# Patient Record
Sex: MALE | Race: WHITE | HISPANIC OR LATINO | URBAN - METROPOLITAN AREA
[De-identification: names, ages, dates, MRNs, and addresses within clinical notes are randomized per-mention and may not be internally consistent; named-entity substitution may affect disease eponyms.]

---

## 2017-12-09 ENCOUNTER — EMERGENCY (EMERGENCY)
Facility: HOSPITAL | Age: 72
LOS: 1 days | Discharge: HOME | End: 2017-12-09
Attending: EMERGENCY MEDICINE | Admitting: EMERGENCY MEDICINE
Payer: SELF-PAY

## 2017-12-09 VITALS
HEART RATE: 66 BPM | SYSTOLIC BLOOD PRESSURE: 125 MMHG | OXYGEN SATURATION: 96 % | DIASTOLIC BLOOD PRESSURE: 86 MMHG | RESPIRATION RATE: 16 BRPM | TEMPERATURE: 96 F

## 2017-12-09 PROCEDURE — 99284 EMERGENCY DEPT VISIT MOD MDM: CPT

## 2017-12-09 PROCEDURE — 70450 CT HEAD/BRAIN W/O DYE: CPT | Mod: 26

## 2017-12-09 NOTE — ED ADULT TRIAGE NOTE - CHIEF COMPLAINT QUOTE
Pt BIBA s/p found intoxicated on the street. As per ems pt had a small amount of blood in his mouth. Pt admits to drinking but denies a fall.

## 2017-12-09 NOTE — ED PROVIDER NOTE - MEDICAL DECISION MAKING DETAILS
clinically intoxicated, no trauma noted, protecting airway, reassess for sobriety, will CT given sign of dried blood though no overt sign of trauma

## 2017-12-09 NOTE — ED PROVIDER NOTE - PHYSICAL EXAMINATION
CON: somnolent, arousable to loud verbal or painful stimuli, HENMT: no pooling of secretion, protecting airway, no facial ecchymosis or evidence of trauma, noted dry blood on lip, HEAD: no obvious hematoma or laceration, CV: rrr, RESP: chest rise, breathing spontaneously, GI: soft, no vomiting, no old vomitus on clothing, SKIN: no laceration or abrasion noted, MSK: no deformity noted, NEURO: limited exam 2/2 mental status

## 2017-12-09 NOTE — ED PROVIDER NOTE - OBJECTIVE STATEMENT
72 yom bibems for intox, admits to EtOH use, no trauma noted.  limited history 2/2 mental status.  noted blood on lips.

## 2017-12-13 DIAGNOSIS — F10.129 ALCOHOL ABUSE WITH INTOXICATION, UNSPECIFIED: ICD-10-CM

## 2017-12-13 DIAGNOSIS — R41.82 ALTERED MENTAL STATUS, UNSPECIFIED: ICD-10-CM

## 2022-03-17 NOTE — ED ADULT NURSE NOTE - LEARNING READINESS
Problem: At Risk for Falls  Goal: # Patient does not fall  Outcome: Outcome Not Met, Continue to Monitor  Goal: # Takes action to control fall-related risks  Outcome: Outcome Not Met, Continue to Monitor  Goal: # Verbalizes understanding of fall risk/precautions  Description: Document education using the patient education activity  Outcome: Outcome Not Met, Continue to Monitor     Problem: At Risk for Injury Due to Fall  Goal: # Patient does not fall  Outcome: Outcome Not Met, Continue to Monitor  Goal: # Takes action to control condition specific risks  Outcome: Outcome Not Met, Continue to Monitor  Goal: # Verbalizes understanding of fall-related injury personal risks  Description: Document education using the patient education activity  Outcome: Outcome Not Met, Continue to Monitor     Problem: Nausea/Vomiting  Goal: Maintains oral intake with decreased/no reports of nausea/vomiting  Outcome: Outcome Not Met, Continue to Monitor  Goal: Current weight maintained or increased  Outcome: Outcome Not Met, Continue to Monitor  Goal: Verbalizes understanding of s/s and strategies to control nausea/vomiting  Description: Document education using the patient education activity.   Outcome: Outcome Not Met, Continue to Monitor  Goal: Decreased reports of nausea/vomiting (Hospice)  Outcome: Outcome Not Met, Continue to Monitor     Problem: Elimination-Bowel, Alteration  Goal: # Bowel function maintained/improved  Outcome: Outcome Not Met, Continue to Monitor  Goal: Bowel function maintained/improved with bowel management program ([facility])  Outcome: Outcome Not Met, Continue to Monitor  Goal: # Verbalizes understanding of constipation management  Description: Document on Patient Education Activity  Outcome: Outcome Not Met, Continue to Monitor  Goal: Verbalizes understanding of bowel management program  Description: Document on Patient Education Activity  Outcome: Outcome Not Met, Continue to Monitor     Problem:  Pain  Goal: #Acceptable pain level achieved/maintained at rest using NRS/Faces  Description: This goal is used for patients who can self-report.  Acceptable means the level is at or below the identified comfort/function goal.  Outcome: Outcome Not Met, Continue to Monitor  Goal: # Acceptable pain level achieved/maintained at rest using NRS/Faces without oversedation (opioid naive or PCA/Epidural infusion)  Description: This goal is used if Opioid-naïve or on PCA/Epidural Infusion.  Outcome: Outcome Not Met, Continue to Monitor  Goal: # Acceptable pain level achieved/maintained with activity using NRS/Faces  Description: This goal is used for patients who can self-report and are not achieving acceptable pain control during activity.  Outcome: Outcome Not Met, Continue to Monitor  Goal: Acceptable pain/comfort level is achieved/maintained at rest (based on Pain Behaviors Scale)  Description: This goal is used for patients who are not able to self-report pain and are assessed for pain using the Pain Behaviors Scale  Outcome: Outcome Not Met, Continue to Monitor  Goal: Acceptable pain/comfort level is achieved/maintained at rest (based on pediatric behavior tool: NIPS, NPASS, or FLACC)  Description: This goal is used for pediatric patients who are not able to self report pain.  Outcome: Outcome Not Met, Continue to Monitor  Goal: # Verbalizes understanding of pain management  Description: Documented in Patient Education Activity  Outcome: Outcome Not Met, Continue to Monitor  Goal: Verbalizes understanding and effective use of Patient Controlled Analgesia (PCA)  Description: Documented in Patient Education Activity  This goal is used for patients with PCA  Outcome: Outcome Not Met, Continue to Monitor      No